# Patient Record
Sex: MALE | Race: BLACK OR AFRICAN AMERICAN
[De-identification: names, ages, dates, MRNs, and addresses within clinical notes are randomized per-mention and may not be internally consistent; named-entity substitution may affect disease eponyms.]

---

## 2021-05-17 ENCOUNTER — HOSPITAL ENCOUNTER (EMERGENCY)
Dept: HOSPITAL 41 - JD.ED | Age: 31
Discharge: HOME | End: 2021-05-17
Payer: SELF-PAY
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
## 2021-05-17 DIAGNOSIS — Z23: ICD-10-CM

## 2021-05-17 DIAGNOSIS — S01.01XA: Primary | ICD-10-CM
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
## 2021-05-17 NOTE — CT
Head CT

 

Technique: Multiple axial sections through the brain were obtained.  

Intravenous contrast was not utilized.  Reconstructed coronal and 

sagittal images were obtained.

 

Comparison: No prior intracranial imaging is available.

 

Findings: Ventricles along with basal cisterns and sulci over the 

convexities appear within normal limits for the patient's age.  No 

abnormal parenchymal densities are seen.  No evidence of intracranial 

hemorrhage.  No midline shift or mass-effect is seen.

 

Bone window settings were reviewed.  Visualized paranasal sinuses and 

mastoid sinuses show nothing acute.  No acute calvarial abnormality is

 appreciated.

 

Impression:

1.  No acute intracranial abnormality is seen.

 

Diagnostic code #1

 

I agree with preliminary report from [facility], finalized on 0 5/17-21, 

4:50 AM CDT, code 1

## 2021-11-30 ENCOUNTER — HOSPITAL ENCOUNTER (EMERGENCY)
Dept: HOSPITAL 41 - JD.ED | Age: 31
LOS: 1 days | Discharge: HOME | End: 2021-12-01
Payer: SELF-PAY

## 2021-11-30 DIAGNOSIS — K92.2: Primary | ICD-10-CM

## 2021-11-30 DIAGNOSIS — Z86.16: ICD-10-CM

## 2021-11-30 NOTE — EDM.PDOC
ED HPI GENERAL MEDICAL PROBLEM





- General


Chief Complaint: General


Stated Complaint: BLOOD IN STOOLS


Time Seen by Provider: 11/30/21 23:23


Source of Information: Reports: Patient


History Limitations: Reports: No Limitations





- History of Present Illness


INITIAL COMMENTS - FREE TEXT/NARRATIVE: 





Mr. Lewis is a very pleasant 31-year-old gentleman who now presents to the ED 

stating that he noticed some blood mixed with stool, along with blood on the 

toilet paper after he had a bowel movement yesterday, Monday, 11/29/2021. He 

states that he has had diarrhea since Sunday, 11/28/2021.  He reports having a 

slight abdominal discomfort last night.  The patient has not taken any 

over-the-counter or home remedies since the onset of his symptoms.





The patient reports having a similar symptoms in the past, perhaps 3 times over 

the past 1 to 2 years, although not as severe.  No prior medical evaluation for 

these symptoms.  He has never undergone an EGD or colonoscopy.





Here in the ED tonight, the patient is found to be hemodynamically stable, 

afebrile, saturating 96% on room air.  He appears to be comfortable, no acute 

distress.





The patient states that he was started on amoxicillin about 2 weeks ago for an 

ear infection, finishing it about 1 week ago.  Otherwise, prior to yesterday, 

the patient denies having a recent fever, chills, sore throat, nasal or sinus 

congestion, cough, dyspnea, chest pain, palpitations, nausea, vomiting, 

constipation, diarrhea, abdominal pain, urinary symptoms, recent weight gain or 

weight loss, recent bloody bowel movements or black bowel movements, recent 

joint aches, headaches, or rashes.








The patient does not have a PCP.


He has not received a COVID vaccination, nor an influenza vaccination this 

season.








  ** Abdomen


Pain Score (Numeric/FACES): 4





- Related Data


                                    Allergies











Allergy/AdvReac Type Severity Reaction Status Date / Time


 


No Known Allergies Allergy   Verified 07/22/19 13:54











Home Meds: 


                                    Home Meds





Amoxicillin 875 mg PO BID 11/30/21 [History]











Past Medical History





- Infectious Disease History


Infectious Disease History: Reports: Novel Coronavirus





- Past Surgical History


GI Surgical History: Reports: Appendectomy





Social & Family History





- Tobacco Use


Tobacco Use Status *Q: Never Tobacco User





- Caffeine Use


Caffeine Use: Reports: Soda, Tea





- Alcohol Use


Alcohol Use History: Yes


Alcohol Use Frequency: Socially





- Recreational Drug Use


Recreational Drug Use: No





- Living Situation & Occupation


Living situation: Reports: Single, with Family


Occupation: Unemployed





ED ROS GENERAL





- Review of Systems


Review Of Systems: Comprehensive ROS is negative, except as noted in HPI.





ED EXAM, GI/ABD





- Physical Exam


Exam: See Below


Exam Limited By: No Limitations


General Appearance: Alert, WD/WN, No Apparent Distress


Eyes: Bilateral: Normal Appearance, EOMI


Ears: Normal External Exam, Hearing Grossly Normal


Nose: Normal Inspection


Throat/Mouth: Normal Inspection, Normal Lips, Normal Voice, No Airway Compromise


Head: Atraumatic, Normocephalic


Neck: Normal Inspection, Full Range of Motion


Respiratory/Chest: No Respiratory Distress, Lungs Clear, Normal Breath Sounds, 

No Accessory Muscle Use


Cardiovascular: Normal Peripheral Pulses, Regular Rate, Rhythm, No Edema, No 

Gallop, No JVD, No Murmur, No Rub


GI/Abdominal Exam: Normal Bowel Sounds, Soft, Non-Tender, No Organomegaly, No 

Distention, No Abnormal Bruit, No Mass


Rectal (Males) Exam: Normal Rectal Tone, Heme + Stool (scant pinkish color), 

Hemorrhoids (2 external, non-thrombosed, nontender)


Back Exam: Normal Inspection, Full Range of Motion, NT


Extremities: Normal Inspection, Normal Range of Motion, No Pedal Edema, Normal 

Capillary Refill


Neurological: Alert, Oriented, Normal Cognition, No Motor/Sensory Deficits


Psychiatric: Normal Affect


Skin Exam: Warm, Dry, Intact, Normal Color, No Rash





Course





- Vital Signs


Last Recorded V/S: 


                                Last Vital Signs











Temp  36.1 C   11/30/21 22:36


 


Pulse  70   11/30/21 22:36


 


Resp  20   11/30/21 22:36


 


BP  114/92 H  11/30/21 22:36


 


Pulse Ox  96   11/30/21 22:36








                                        





Orthostatic Blood Pressure [     135/87


Standing]                        


Orthostatic Blood Pressure [     133/85


Supine]                          











- Orders/Labs/Meds


Labs: 


                                Laboratory Tests











  11/30/21 11/30/21 12/01/21 Range/Units





  23:10 23:10 02:25 


 


WBC  6.72    (4.23-9.07)  K/mm3


 


RBC  4.78    (4.63-6.08)  M/mm3


 


Hgb  14.9    (13.7-17.5)  gm/dl


 


Hct  45.0    (40.1-51.0)  %


 


MCV  94.1 H    (79.0-92.2)  fl


 


MCH  31.2    (25.7-32.2)  pg


 


MCHC  33.1    (32.2-35.5)  g/dl


 


RDW Std Deviation  42.2    (35.1-43.9)  fL


 


Plt Count  317    (163-337)  K/mm3


 


MPV  8.9 L    (9.4-12.3)  fl


 


Neut % (Auto)  58.3    (34.0-67.9)  %


 


Lymph % (Auto)  35.1    (21.8-53.1)  %


 


Mono % (Auto)  5.2 L    (5.3-12.2)  %


 


Eos % (Auto)  0.7 L    (0.8-7.0)  


 


Baso % (Auto)  0.3    (0.1-1.2)  %


 


Neut # (Auto)  3.91    (1.78-5.38)  K/mm3


 


Lymph # (Auto)  2.36    (1.32-3.57)  K/mm3


 


Mono # (Auto)  0.35    (0.30-0.82)  K/mm3


 


Eos # (Auto)  0.05    (0.04-0.54)  K/mm3


 


Baso # (Auto)  0.02    (0.01-0.08)  K/mm3


 


Sodium   143   (136-145)  mEq/L


 


Potassium   4.3   (3.5-5.1)  mEq/L


 


Chloride   107   ()  mEq/L


 


Carbon Dioxide   25   (21-32)  mEq/L


 


Anion Gap   15.3 H   (5-15)  


 


BUN   19 H   (7-18)  mg/dL


 


Creatinine   1.5 H   (0.7-1.3)  mg/dL


 


Est Cr Clr Drug Dosing   79.76   mL/min


 


Estimated GFR (MDRD)   > 60   (>60)  mL/min


 


BUN/Creatinine Ratio   12.7 L   (14-18)  


 


Glucose   110 H   (70-99)  mg/dL


 


Calcium   9.1   (8.5-10.1)  mg/dL


 


Total Bilirubin   0.3   (0.2-1.0)  mg/dL


 


AST   16   (15-37)  U/L


 


ALT   31   (16-63)  U/L


 


Alkaline Phosphatase   59   ()  U/L


 


Total Protein   7.7   (6.4-8.2)  g/dl


 


Albumin   3.9   (3.4-5.0)  g/dl


 


Globulin   3.8   gm/dL


 


Albumin/Globulin Ratio   1.0   (1-2)  


 


C.difficile [id number]    Presumptive negative  


 


C. difficile Tox (PCR)    Negative  














- Re-Assessments/Exams


Free Text/Narrative Re-Assessment/Exam: 





11/30/21 23:55


Scant pinkish colored stool on the finger was grossly heme positive on rectal 

exam.  Orthostatics, a CBC, and CMP were ordered at triage.





The patient is not orthostatic.


His CBC is unremarkable.


His CMP is remarkable for a BUN/Cr mildly elevated at 19/1.5, with slight 

hyperglycemia of 110, and the remainder of his CMP being unremarkable.





I have ordered a stool for C. difficile.








12/01/21 03:55


Stool C. difficile is negative.








12/01/21 03:58


Test results discussed with the patient.  I explained that I am unable to 

determine the cause of his bleeding - it is most likely due to a hemorrhoid, but

could be due to something more serious, such as a colon polyp.  I will refer him

to Dr. Ryan for further evaluation, that will likely include a colonoscopy.











Departure





- Departure


Time of Disposition: 03:59


Disposition: Home, Self-Care 01


Condition: Good


Clinical Impression: 


 Lower GI bleed








- Discharge Information


*PRESCRIPTION DRUG MONITORING PROGRAM REVIEWED*: Not Applicable


*COPY OF PRESCRIPTION DRUG MONITORING REPORT IN PATIENT XIOMY: Not Applicable


Instructions:  Lower Gastrointestinal Bleeding


Referrals: 


PCP,None [Primary Care Provider] - 


Stephen Ryan MD [Physician] - 


Forms:  ED Department Discharge


Additional Instructions: 


You were seen in the emergency room after experiencing bloody diarrhea with some

 abdominal discomfort.





Work-up in the ER included positional blood pressure checks, some blood test, 

and a stool for C. difficile.





Your entire work-up was unremarkable.  You are not intravascularly depleted.  

You are not anemic.  Your stool returned negative for C. difficile.





The exact source of your lower GI bleed cannot be determined from the ER - 

further work-up is necessary.





Please follow-up with the Surgeon Dr. Stephen Ryan for further evaluation, 

that may include a colonoscopy.





If any other problems, please do not hesitate to return to the ER.